# Patient Record
Sex: FEMALE | Race: BLACK OR AFRICAN AMERICAN | NOT HISPANIC OR LATINO | Employment: FULL TIME | URBAN - METROPOLITAN AREA
[De-identification: names, ages, dates, MRNs, and addresses within clinical notes are randomized per-mention and may not be internally consistent; named-entity substitution may affect disease eponyms.]

---

## 2022-04-27 ENCOUNTER — HOSPITAL ENCOUNTER (EMERGENCY)
Facility: HOSPITAL | Age: 27
Discharge: HOME | End: 2022-04-27
Attending: EMERGENCY MEDICINE

## 2022-04-27 VITALS
RESPIRATION RATE: 19 BRPM | OXYGEN SATURATION: 100 % | WEIGHT: 293 LBS | DIASTOLIC BLOOD PRESSURE: 63 MMHG | SYSTOLIC BLOOD PRESSURE: 152 MMHG | HEIGHT: 69 IN | HEART RATE: 84 BPM | TEMPERATURE: 99 F | BODY MASS INDEX: 43.4 KG/M2

## 2022-04-27 DIAGNOSIS — I10 HYPERTENSION, UNSPECIFIED TYPE: ICD-10-CM

## 2022-04-27 DIAGNOSIS — R60.0 LOWER EXTREMITY EDEMA: Primary | ICD-10-CM

## 2022-04-27 LAB
ALBUMIN SERPL BCP-MCNC: 3.7 G/DL (ref 3.5–5.2)
ALP SERPL-CCNC: 50 U/L (ref 55–135)
ALT SERPL W/O P-5'-P-CCNC: 29 U/L (ref 10–44)
ANION GAP SERPL CALC-SCNC: 10 MMOL/L (ref 8–16)
AST SERPL-CCNC: 25 U/L (ref 10–40)
B-HCG UR QL: NEGATIVE
BASOPHILS # BLD AUTO: 0.02 K/UL (ref 0–0.2)
BASOPHILS NFR BLD: 0.2 % (ref 0–1.9)
BILIRUB SERPL-MCNC: 0.5 MG/DL (ref 0.1–1)
BNP SERPL-MCNC: 32 PG/ML (ref 0–99)
BUN SERPL-MCNC: 12 MG/DL (ref 6–20)
CALCIUM SERPL-MCNC: 9 MG/DL (ref 8.7–10.5)
CHLORIDE SERPL-SCNC: 108 MMOL/L (ref 95–110)
CK MB SERPL-MCNC: 1.4 NG/ML (ref 0.1–6.5)
CK SERPL-CCNC: 352 U/L (ref 20–180)
CO2 SERPL-SCNC: 22 MMOL/L (ref 23–29)
CREAT SERPL-MCNC: 0.8 MG/DL (ref 0.5–1.4)
CTP QC/QA: YES
D DIMER PPP IA.FEU-MCNC: 0.4 UG/ML FEU
DIFFERENTIAL METHOD: ABNORMAL
EOSINOPHIL # BLD AUTO: 0.3 K/UL (ref 0–0.5)
EOSINOPHIL NFR BLD: 2.8 % (ref 0–8)
ERYTHROCYTE [DISTWIDTH] IN BLOOD BY AUTOMATED COUNT: 14.8 % (ref 11.5–14.5)
EST. GFR  (AFRICAN AMERICAN): >60 ML/MIN/1.73 M^2
EST. GFR  (NON AFRICAN AMERICAN): >60 ML/MIN/1.73 M^2
GLUCOSE SERPL-MCNC: 110 MG/DL (ref 70–110)
HCT VFR BLD AUTO: 42 % (ref 37–48.5)
HGB BLD-MCNC: 13.3 G/DL (ref 12–16)
IMM GRANULOCYTES # BLD AUTO: 0.04 K/UL (ref 0–0.04)
IMM GRANULOCYTES NFR BLD AUTO: 0.4 % (ref 0–0.5)
LYMPHOCYTES # BLD AUTO: 3.4 K/UL (ref 1–4.8)
LYMPHOCYTES NFR BLD: 33.9 % (ref 18–48)
MCH RBC QN AUTO: 25.7 PG (ref 27–31)
MCHC RBC AUTO-ENTMCNC: 31.7 G/DL (ref 32–36)
MCV RBC AUTO: 81 FL (ref 82–98)
MONOCYTES # BLD AUTO: 0.8 K/UL (ref 0.3–1)
MONOCYTES NFR BLD: 8.3 % (ref 4–15)
NEUTROPHILS # BLD AUTO: 5.4 K/UL (ref 1.8–7.7)
NEUTROPHILS NFR BLD: 54.4 % (ref 38–73)
NRBC BLD-RTO: 0 /100 WBC
PLATELET # BLD AUTO: 283 K/UL (ref 150–450)
PMV BLD AUTO: 10.1 FL (ref 9.2–12.9)
POTASSIUM SERPL-SCNC: 3.8 MMOL/L (ref 3.5–5.1)
PROT SERPL-MCNC: 7.1 G/DL (ref 6–8.4)
RBC # BLD AUTO: 5.18 M/UL (ref 4–5.4)
SODIUM SERPL-SCNC: 140 MMOL/L (ref 136–145)
TROPONIN I SERPL DL<=0.01 NG/ML-MCNC: <0.03 NG/ML
WBC # BLD AUTO: 9.97 K/UL (ref 3.9–12.7)

## 2022-04-27 PROCEDURE — 85379 FIBRIN DEGRADATION QUANT: CPT | Performed by: EMERGENCY MEDICINE

## 2022-04-27 PROCEDURE — 93010 EKG 12-LEAD: ICD-10-PCS | Mod: ,,, | Performed by: INTERNAL MEDICINE

## 2022-04-27 PROCEDURE — 82553 CREATINE MB FRACTION: CPT | Performed by: EMERGENCY MEDICINE

## 2022-04-27 PROCEDURE — 84484 ASSAY OF TROPONIN QUANT: CPT | Performed by: EMERGENCY MEDICINE

## 2022-04-27 PROCEDURE — 82550 ASSAY OF CK (CPK): CPT | Performed by: EMERGENCY MEDICINE

## 2022-04-27 PROCEDURE — 99285 EMERGENCY DEPT VISIT HI MDM: CPT | Mod: 25

## 2022-04-27 PROCEDURE — 80053 COMPREHEN METABOLIC PANEL: CPT | Performed by: EMERGENCY MEDICINE

## 2022-04-27 PROCEDURE — 85025 COMPLETE CBC W/AUTO DIFF WBC: CPT | Performed by: EMERGENCY MEDICINE

## 2022-04-27 PROCEDURE — 83880 ASSAY OF NATRIURETIC PEPTIDE: CPT | Performed by: EMERGENCY MEDICINE

## 2022-04-27 PROCEDURE — 81025 URINE PREGNANCY TEST: CPT | Performed by: EMERGENCY MEDICINE

## 2022-04-27 PROCEDURE — 93010 ELECTROCARDIOGRAM REPORT: CPT | Mod: ,,, | Performed by: INTERNAL MEDICINE

## 2022-04-27 PROCEDURE — 93005 ELECTROCARDIOGRAM TRACING: CPT | Performed by: INTERNAL MEDICINE

## 2022-04-27 RX ORDER — HYDROCHLOROTHIAZIDE 25 MG/1
25 TABLET ORAL DAILY
Qty: 10 TABLET | Refills: 0 | Status: SHIPPED | OUTPATIENT
Start: 2022-04-27 | End: 2022-05-07

## 2022-04-27 NOTE — ED PROVIDER NOTES
Encounter Date: 4/27/2022       History     Chief Complaint   Patient presents with    Shortness of Breath     SOB x 2 days    Leg Swelling     x3-5 days, ble with +1 pitting.  Reports swelling to Rt side worse than Lt     26-year-old female with history of obesity.  Patient presents emergency department with complaint of bilateral lower extremity swelling over last 2-3 days.  Patient states that she has had mild shortness of breath as well.  Patient states that her symptoms began after eating a lot of salt and shellfish.  Patient states she has been eating crawfish as well.  She denies trauma, no chest pain, no nausea, no vomiting, no other constitutional symptoms.        Review of patient's allergies indicates:  No Known Allergies  No past medical history on file.  No past surgical history on file.  No family history on file.     Review of Systems   Constitutional: Negative for fever.   HENT: Negative for sore throat.    Respiratory: Positive for shortness of breath.    Cardiovascular: Positive for leg swelling. Negative for chest pain.   Gastrointestinal: Negative for nausea and vomiting.   Genitourinary: Negative for dysuria.   Musculoskeletal: Negative for arthralgias, back pain and myalgias.   Skin: Negative for rash.   Neurological: Negative for weakness.   Hematological: Does not bruise/bleed easily.       Physical Exam     Initial Vitals [04/27/22 0626]   BP Pulse Resp Temp SpO2   (!) 140/84 100 (!) 24 98.7 °F (37.1 °C) 99 %      MAP       --         Physical Exam    Nursing note and vitals reviewed.  Constitutional: She appears well-developed and well-nourished.   HENT:   Head: Normocephalic and atraumatic.   Nose: Nose normal.   Mouth/Throat: Oropharynx is clear and moist.   Eyes: Conjunctivae and EOM are normal. Pupils are equal, round, and reactive to light.   Neck: Neck supple. No thyromegaly present. No tracheal deviation present.   Normal range of motion.  Cardiovascular: Normal rate, regular  rhythm, normal heart sounds and intact distal pulses. Exam reveals no gallop and no friction rub.    No murmur heard.  Pulmonary/Chest: No stridor. No respiratory distress.   Course bilateral breath sounds no adventitious sounds   Abdominal: Abdomen is soft. Bowel sounds are normal. She exhibits no mass. There is no rebound and no guarding.   Musculoskeletal:         General: Edema present. Normal range of motion.      Cervical back: Normal range of motion and neck supple.      Comments: Bilateral lower extremity +1 edema bilaterally right greater than left.  No rash noted, no fluctuance noted.     Lymphadenopathy:     She has no cervical adenopathy.   Neurological: She is alert and oriented to person, place, and time. She has normal strength and normal reflexes. GCS score is 15. GCS eye subscore is 4. GCS verbal subscore is 5. GCS motor subscore is 6.   Skin: Skin is warm and dry. Capillary refill takes less than 2 seconds.   Psychiatric: She has a normal mood and affect.         ED Course   Procedures  Labs Reviewed   CBC W/ AUTO DIFFERENTIAL - Abnormal; Notable for the following components:       Result Value    MCV 81 (*)     MCH 25.7 (*)     MCHC 31.7 (*)     RDW 14.8 (*)     All other components within normal limits   COMPREHENSIVE METABOLIC PANEL - Abnormal; Notable for the following components:    CO2 22 (*)     Alkaline Phosphatase 50 (*)     All other components within normal limits   CK - Abnormal; Notable for the following components:     (*)     All other components within normal limits   TROPONIN I   B-TYPE NATRIURETIC PEPTIDE   CK-MB   D DIMER, QUANTITATIVE   POCT URINE PREGNANCY        ECG Results          EKG 12-lead (In process)  Result time 04/27/22 07:09:18    In process by Interface, Lab In [facility] (04/27/22 07:09:18)                 Narrative:    Test Reason : R07.9,    Vent. Rate : 089 BPM     Atrial Rate : 089 BPM     P-R Int : 130 ms          QRS Dur : 084 ms      QT Int : 380 ms        P-R-T Axes : 054 071 065 degrees     QTc Int : 462 ms    Normal sinus rhythm  Normal ECG  No previous ECGs available    Referred By: [email or web address]   SELF           Confirmed By:                             Imaging Results          US Lower Extremity Veins Bilateral (Final result)  Result time 04/27/22 08:32:08    Final result by Phillip Flowers MD (04/27/22 08:32:08)                 Narrative:    VENOUS DOPPLER ULTRASOUND BILATERAL LOWER EXTREMITIES    CLINICAL DATA: Leg swelling.    FINDINGS: Color and duplex Doppler sonographic assessment with spectral analysis of the bilateral lower extremities demonstrates no evidence of deep vein thrombosis. Normal color Doppler flow, augmentation, and compressibility are demonstrated at all levels.    IMPRESSION:  1. No evidence of DVT in the bilateral lower extremities.    Electronically signed by:  Phillip Flowers MD  4/27/2022 8:32 AM CDT Workstation: [id number]                             X-Ray Chest AP Portable (Final result)  Result time 04/27/22 06:59:44    Final result by Phillip Flowers MD (04/27/22 06:59:44)                 Narrative:    Chest single view    Clinical data:Chest pain.    FINDINGS: AP view of the chest demonstrates no cardiac, pulmonary, or osseous abnormalities.    IMPRESSION:  1. Normal chest single view.    Electronically signed by:  Phillip Flowers MD  4/27/2022 6:59 AM deltaDNAT Workstation: [id number]                               Medications - No data to display  Medical Decision Making:   Initial Assessment:   26-year-old female with history of obesity.  Patient presents emergency department with complaint of bilateral lower extremity swelling over last 2-3 days.  Patient states that she has had mild shortness of breath as well.  Patient states that her symptoms began after eating a lot of salt and shellfish.  Patient states she has been eating crawfish as well.  She denies trauma, no chest pain, no nausea, no vomiting, no other  constitutional symptoms.        Differential Diagnosis:   Venous stasis, venous insufficiency, essential hypertension, DVT,  Clinical Tests:   Lab Tests: Ordered and Reviewed  Radiological Study: Ordered and Reviewed  Medical Tests: Ordered and Reviewed             ED Course as of 04/27/22 1154   Wed Apr 27, 2022   1152 Patient seen evaluated emergency department.  Currently at this time patient found with out evidence of DVT.  No evidence of cardiac ischemia as well.  And had normal D-dimer.  Patient does have obesity with elevated blood pressure throughout emergency department stay at 130-140 systolic blood pressures 70s to 80s diastolic pressures.  Patient at this time will be discharged with hydrochlorothiazide take daily with close follow-up to internal medicine clinic for monitoring of blood pressure control.  Patient also advised to avoid salty foods.  She is to use diet and lifestyle modification with weight loss.  She is return to emergency department if problems persist worse or additional concerns. [RM]      ED Course User Index  [RM] Yoandy Cool MD             Clinical Impression:   Final diagnoses:  [R60.0] Lower extremity edema (Primary)  [I10] Hypertension, unspecified type          ED Disposition Condition    Discharge Stable        ED Prescriptions     None        Follow-up Information    None          Yoandy Cool MD  04/27/22 1154